# Patient Record
Sex: MALE | Race: WHITE | Employment: FULL TIME | URBAN - METROPOLITAN AREA
[De-identification: names, ages, dates, MRNs, and addresses within clinical notes are randomized per-mention and may not be internally consistent; named-entity substitution may affect disease eponyms.]

---

## 2022-12-14 ENCOUNTER — HOSPITAL ENCOUNTER (OUTPATIENT)
Dept: GENERAL RADIOLOGY | Age: 22
Discharge: HOME | End: 2022-12-16

## 2022-12-14 ENCOUNTER — HOSPITAL ENCOUNTER (OUTPATIENT)
Age: 22
Discharge: HOME | End: 2022-12-16

## 2022-12-14 DIAGNOSIS — S96.911A STRAIN OF RIGHT ANKLE, INITIAL ENCOUNTER: ICD-10-CM

## 2022-12-14 DIAGNOSIS — S90.01XA CONTUSION OF RIGHT ANKLE, INITIAL ENCOUNTER: ICD-10-CM

## 2022-12-14 DIAGNOSIS — S90.31XA CONTUSION OF RIGHT FOOT, INITIAL ENCOUNTER: ICD-10-CM

## 2022-12-14 DIAGNOSIS — S93.601A SPRAIN OF RIGHT FOOT, INITIAL ENCOUNTER: ICD-10-CM

## 2022-12-14 PROCEDURE — 73610 X-RAY EXAM OF ANKLE: CPT

## 2022-12-14 PROCEDURE — 73630 X-RAY EXAM OF FOOT: CPT

## 2022-12-15 ENCOUNTER — OFFICE VISIT (OUTPATIENT)
Dept: PODIATRY | Age: 22
End: 2022-12-15

## 2022-12-15 ENCOUNTER — HOSPITAL ENCOUNTER (OUTPATIENT)
Dept: PREADMISSION TESTING | Age: 22
Setting detail: OUTPATIENT SURGERY
End: 2022-12-15
Payer: COMMERCIAL

## 2022-12-15 ENCOUNTER — ANESTHESIA EVENT (OUTPATIENT)
Dept: OPERATING ROOM | Age: 22
End: 2022-12-15
Payer: COMMERCIAL

## 2022-12-15 VITALS — WEIGHT: 140 LBS | BODY MASS INDEX: 21.97 KG/M2 | HEIGHT: 67 IN

## 2022-12-15 DIAGNOSIS — S92.311A CLOSED DISPLACED FRACTURE OF FIRST METATARSAL BONE OF RIGHT FOOT, INITIAL ENCOUNTER: Primary | ICD-10-CM

## 2022-12-15 DIAGNOSIS — M79.671 PAIN IN RIGHT FOOT: ICD-10-CM

## 2022-12-15 DIAGNOSIS — R60.0 EDEMA, LOWER EXTREMITY: ICD-10-CM

## 2022-12-15 RX ORDER — LISINOPRIL 40 MG/1
TABLET ORAL
COMMUNITY
Start: 2022-10-27

## 2022-12-15 ASSESSMENT — ENCOUNTER SYMPTOMS
BACK PAIN: 0
COLOR CHANGE: 0
SHORTNESS OF BREATH: 0
DIARRHEA: 0
NAUSEA: 0

## 2022-12-15 NOTE — PROGRESS NOTES
Pre-op Instructions For Out-Patient Surgery    Medication Instructions:  Please stop herbs and any supplements now (includes vitamins and minerals). Please contact your surgeon and prescribing physician for pre-op instructions for any blood thinners. If you have inhalers/aerosol treatments at home, please use them the morning of your surgery and bring the inhalers with you to the hospital.    Please take the following medications the morning of your surgery with a sip of water:    lisinopril    Surgery Instructions:  After midnight before surgery:  Do not eat or drink anything, including water, mints, gum, and hard candy. You may brush your teeth without swallowing. No smoking, chewing tobacco, or street drugs. Please shower or bathe before surgery. If you were given Surgical Scrub Chlorhexidine Gluconate Liquid (CHG), please shower the night before and the morning of your surgery following the detailed instructions you received during your pre-admission visit. Please do not wear any cologne, lotion, powder, deodorant, jewelry, piercings, perfume, makeup, nail polish, hair accessories, or hair spray on the day of surgery. Wear loose comfortable clothing. Leave your valuables at home. Bring a storage case for any glasses/contacts. An adult who is responsible for you MUST drive you home and should be with you for the first 24 hours after surgery. If having out-patient knee and foot surgeries, please arrange for planned crutches, walker, or wheelchair before arriving to the hospital.    The Day of Surgery:  Arrive at [address] [place] [address] Surgery Entrance at the time directed by your surgeon and check in at the desk. If you have a living will or healthcare power of , please bring a copy. You will be taken to the pre-op holding area where you will be prepared for surgery.   A physical assessment will be performed by a nurse practitioner or house officer. Your IV will be started and you will meet your anesthesiologist.    When you go to surgery, your family will be directed to the surgical waiting room, where the doctor should speak with them after your surgery. After surgery, you will be taken to the recovery room then when you are awake and stable you will go to the short stay unit for preparation to be discharged. If you use a Bi-PAP or C-PAP machine, please bring it with you and leave it in the car in case it is needed in recovery room.

## 2022-12-15 NOTE — PROGRESS NOTES
Vanna Portillo is a 25 y.o. male who presents to the office today with chief complaint of pain to the right foot. Chief Complaint   Patient presents with    Foot Injury     Patient dropped a post  on right foot yesterday    Symptoms began about 1 day(s) ago. Patient complains of injury to the right foot. Patient states that he dropped a post  on his right foot yesterday. Pain is rated 8 out of 10 at it's worst and is described as intermittent. Treatments prior to today's visit include: Patient went to [facility] for evaluation and xrays were taken. Patient was told that he fractured his foot. Patient was placed in a posterior splint under compression with a fracture shoe and was given crutches with instructions to remain NWB at all times. Patient was referred to my office for care. No Known Allergies    Past Medical History:   Diagnosis Date    High blood pressure        Prior to Admission medications    Medication Sig Start Date End Date Taking? Authorizing Provider   lisinopril (PRINIVIL;ZESTRIL) 40 MG tablet TAKE 1 TABLET BY MOUTH EVERY DAY FOR 90 DAYS 10/27/22   Historical Provider, MD       Past Surgical History:   Procedure Laterality Date    TONSILLECTOMY      WISDOM TOOTH EXTRACTION         No family history on file. Social History     Tobacco Use    Smoking status: Never    Smokeless tobacco: Never   Substance Use Topics    Alcohol use: Yes     Comment: socially       Review of Systems   Constitutional:  Negative for activity change, appetite change, chills, diaphoresis, fatigue and fever. Respiratory:  Negative for shortness of breath. Cardiovascular:  Negative for leg swelling. Gastrointestinal:  Negative for diarrhea and nausea. Endocrine: Negative for cold intolerance, heat intolerance and polyuria. Musculoskeletal:  Positive for gait problem and joint swelling. Negative for arthralgias, back pain and myalgias. Skin:  Negative for color change, pallor, rash and wound. Allergic/Immunologic: Negative for environmental allergies and food allergies. Neurological:  Negative for dizziness, weakness, light-headedness and numbness. Hematological:  Does not bruise/bleed easily. Psychiatric/Behavioral:  Negative for behavioral problems, confusion and self-injury. The patient is not nervous/anxious. Vitals: There were no vitals filed for this visit. General: AAO x 3 in NAD. Integument: There are no rashes, ulcers, or breaks in the skin noted to the bilateral lower extremities. There is no induration, subcutaneous nodules, or tightening of the skin noted to the bilateral.     Toenails 1-5 of the right foot do not present with thickness, elongation, discoloration, brittleness, subungual debris. Toenails 1-5 of the left foot do not present with thickness, elongation, discoloration, brittleness, subungual debris. Interdigital maceration absent to web spaces 1-4, Bilateral.     There are no preulcerative lesions noted to the right foot. There are no preulcerative lesions noted to the left foot. The skin to the bilateral feet is not thin and shiny. The skin to the bilateral feet is  warm, supple, and dry. Vascular: DP pulse of the right foot is  palpable. DP pulse of the left foot is  palpable. PT pulse of the right foot is  palpable. PT pulse of the left foot is  palpable. CFT is less than 3 secs to the digits of the right foot. CFT is less than 3 secs to the digits of the left foot. There is edema noted to the right foot. There is hair growth noted to the digits of the bilateral feet. There are no varicosities noted to the right foot/ankle. There are no varicosities noted to the left foot/ankle. Erythema is absent to the bilateral feet. Neurological: Reflexes are present to the right plantar foot and to the Achilles tendon. Reflexes are present to the left plantar foot and to the Achilles tendon. Epicritic sensation is  intact to the right foot. Epicritic sensation is  intact to the left foot. Musculoskeletal:  Muscle strength is +5/5 to all four muscle groups of the left lower extremity. Muscle strength of the right lower extremity was not evaluated due to the patient's pain and injury. There are no areas of subluxation, dislocation, or laxity noted to either lower extremity. Range of motion to the right ankle is  free of pain or grinding. Range of motion to the left ankle is  free of pain or grinding. Range of motion to the right subtalar joint is  free of pain or grinding. Range of motion to the left subtalar joint is  free of pain or grinding. No abnormalities, asymmetries, or misalignments are seen between the extremities. Weightbearing evaluation was not performed due to the patient's pain and injury. The lesser digits of the right foot are not contracted. The lesser digits of the left foot are not contracted. There is no prominence noted to the first metatarsal head without abduction of the hallux of the right foot. There is no prominence noted to the first metatarsal head without abduction of the hallux of the left foot. There is pain with palpation to the right first metatarsal shaft. Shoe examination was performed. Biomechanical Exam: was not evaluated due to the patient's pain and injury. X-ray's reviewed from the hospital: AP, Lateral, and Medial Oblique of the right foot. These views are NWB. Findings: There is a mildly displaced fracture to the first metatarsal with dorsal displacement. This displacement is most noted on the medial oblique. However, since the lateral view was not done WB, it is difficult to evaluate alignment of the first metatarsal. Therefore, a weightbearing lateral view of the right foot was done here in the office.  There is dorsal displacement noted to the first metatarsal on the weightbearing lateral view.      Asessment: Patient is a 25 y.o. male with:    Diagnosis Orders   1. Closed displaced fracture of first metatarsal bone of right foot, initial encounter  XR FOOT RIGHT (2 VIEWS)    SD PNEUMAT WALKING BOOT PRE CST      2. Edema, lower extremity  XR FOOT RIGHT (2 VIEWS)    SD PNEUMAT WALKING BOOT PRE CST      3. Pain in right foot  XR FOOT RIGHT (2 VIEWS)    SD PNEUMAT WALKING BOOT PRE CST          Plan:  1. Clinical evaluation of the patient. 2. I have dispensed a short pneumatic equalizer walker to the patient's right lower extremity. Due to the patient's diagnosis and related symptoms this is medically necessary for the treatment. The function of this device is to restrict and limit motion and provide stabilization and compression to the affected area. Specific instructions on weight bearing and ROM exercises were discussed and given to the patient. Patient to wear this boot at all times and remain strictly NWB at all times. The goals and function of this device were explained in detail to the patient. Upon dispensing, the device appeared to be fitting well and the patient states that the device is comfortable at this time. The patient was shown how to properly apply, wear, and care for the device. The patient was able to properly apply the device and ambulate with it without distress. At the time that the device was dispensed it was suitable for the patient's condition and was not substandard. No guarantees were given or implied and the precautions of the device were reviewed the patient. Written instructions and warranty information was given along with the list of the twenty-one (21) Durable Medical Equipment Supplier Guidelines. All patient questions were answered satisfactorily. Patient was instructed on proper rest, ice, compression, and elevation of the right lower extremity.  Patient informed that he will require surgery and he is schedule for this tomorrow, 12/16/2022 for ORIF right first metatarsal fracture. 3. Contact office with any questions/problems/concerns. Return if symptoms worsen or fail to improve, for Patient will be scheduled for surgery right foot.    12/15/2022      Charlotte Olvera DPM

## 2022-12-16 ENCOUNTER — ANESTHESIA (OUTPATIENT)
Dept: OPERATING ROOM | Age: 22
End: 2022-12-16
Payer: COMMERCIAL

## 2022-12-16 ENCOUNTER — APPOINTMENT (OUTPATIENT)
Dept: GENERAL RADIOLOGY | Age: 22
End: 2022-12-16
Attending: PODIATRIST
Payer: COMMERCIAL

## 2022-12-16 ENCOUNTER — HOSPITAL ENCOUNTER (OUTPATIENT)
Age: 22
Setting detail: OUTPATIENT SURGERY
Discharge: HOME | End: 2022-12-16
Attending: PODIATRIST | Admitting: PODIATRIST
Payer: COMMERCIAL

## 2022-12-16 VITALS
HEART RATE: 70 BPM | WEIGHT: 140 LBS | BODY MASS INDEX: 21.97 KG/M2 | HEIGHT: 67 IN | DIASTOLIC BLOOD PRESSURE: 57 MMHG | RESPIRATION RATE: 18 BRPM | TEMPERATURE: 98.1 F | SYSTOLIC BLOOD PRESSURE: 105 MMHG | OXYGEN SATURATION: 100 %

## 2022-12-16 DIAGNOSIS — G89.18 POST-OPERATIVE PAIN: Primary | ICD-10-CM

## 2022-12-16 PROCEDURE — 3700000000 HC ANESTHESIA ATTENDED CARE: Performed by: PODIATRIST

## 2022-12-16 PROCEDURE — 2500000003 HC RX 250 WO HCPCS: Performed by: ANESTHESIOLOGY

## 2022-12-16 PROCEDURE — 7100000010 HC PHASE II RECOVERY - FIRST 15 MIN: Performed by: PODIATRIST

## 2022-12-16 PROCEDURE — 7100000000 HC PACU RECOVERY - FIRST 15 MIN: Performed by: PODIATRIST

## 2022-12-16 PROCEDURE — 7100000030 HC ASPR PHASE II RECOVERY - FIRST 15 MIN: Performed by: PODIATRIST

## 2022-12-16 PROCEDURE — 7100000011 HC PHASE II RECOVERY - ADDTL 15 MIN: Performed by: PODIATRIST

## 2022-12-16 PROCEDURE — 3700000001 HC ADD 15 MINUTES (ANESTHESIA): Performed by: PODIATRIST

## 2022-12-16 PROCEDURE — 7100000001 HC PACU RECOVERY - ADDTL 15 MIN: Performed by: PODIATRIST

## 2022-12-16 PROCEDURE — 6370000000 HC RX 637 (ALT 250 FOR IP): Performed by: ANESTHESIOLOGY

## 2022-12-16 PROCEDURE — 6360000002 HC RX W HCPCS

## 2022-12-16 PROCEDURE — 2709999900 HC NON-CHARGEABLE SUPPLY: Performed by: PODIATRIST

## 2022-12-16 PROCEDURE — 3600000013 HC SURGERY LEVEL 3 ADDTL 15MIN: Performed by: PODIATRIST

## 2022-12-16 PROCEDURE — 2500000003 HC RX 250 WO HCPCS: Performed by: PODIATRIST

## 2022-12-16 PROCEDURE — 73630 X-RAY EXAM OF FOOT: CPT

## 2022-12-16 PROCEDURE — 3600000003 HC SURGERY LEVEL 3 BASE: Performed by: PODIATRIST

## 2022-12-16 PROCEDURE — C1713 ANCHOR/SCREW BN/BN,TIS/BN: HCPCS | Performed by: PODIATRIST

## 2022-12-16 PROCEDURE — 2500000003 HC RX 250 WO HCPCS

## 2022-12-16 PROCEDURE — 2580000003 HC RX 258: Performed by: ANESTHESIOLOGY

## 2022-12-16 PROCEDURE — 2720000010 HC SURG SUPPLY STERILE: Performed by: PODIATRIST

## 2022-12-16 PROCEDURE — 7100000031 HC ASPR PHASE II RECOVERY - ADDTL 15 MIN: Performed by: PODIATRIST

## 2022-12-16 DEVICE — LOCKING SCREW, FULLY THREADED,T8
Type: IMPLANTABLE DEVICE | Site: FOOT | Status: FUNCTIONAL
Brand: VARIAX

## 2022-12-16 DEVICE — NARROW LOCKING T-PLATE, 2.4
Type: IMPLANTABLE DEVICE | Site: FOOT | Status: FUNCTIONAL
Brand: VARIAX

## 2022-12-16 DEVICE — BONE SCREW, FULLY THREADED, T8
Type: IMPLANTABLE DEVICE | Site: FOOT | Status: FUNCTIONAL
Brand: VARIAX

## 2022-12-16 DEVICE — CANNULATED SCREW
Type: IMPLANTABLE DEVICE | Site: FOOT | Status: FUNCTIONAL
Brand: ASNIS

## 2022-12-16 DEVICE — LOCKING SCREW
Type: IMPLANTABLE DEVICE | Site: FOOT | Status: FUNCTIONAL
Brand: VARIAX

## 2022-12-16 DEVICE — KIRSCHNER WIRE , L, TIPS TROCAR , SMOOTH: Type: IMPLANTABLE DEVICE | Site: FOOT | Status: FUNCTIONAL

## 2022-12-16 RX ORDER — MEPERIDINE HYDROCHLORIDE 25 MG/ML
12.5 INJECTION INTRAMUSCULAR; INTRAVENOUS; SUBCUTANEOUS EVERY 5 MIN PRN
Status: DISCONTINUED | OUTPATIENT
Start: 2022-12-16 | End: 2022-12-16 | Stop reason: HOSPADM

## 2022-12-16 RX ORDER — LIDOCAINE HYDROCHLORIDE 10 MG/ML
INJECTION, SOLUTION EPIDURAL; INFILTRATION; INTRACAUDAL; PERINEURAL PRN
Status: DISCONTINUED | OUTPATIENT
Start: 2022-12-16 | End: 2022-12-16 | Stop reason: SDUPTHER

## 2022-12-16 RX ORDER — SODIUM CHLORIDE 9 MG/ML
INJECTION, SOLUTION INTRAVENOUS PRN
Status: DISCONTINUED | OUTPATIENT
Start: 2022-12-16 | End: 2022-12-16 | Stop reason: HOSPADM

## 2022-12-16 RX ORDER — OXYCODONE HYDROCHLORIDE AND ACETAMINOPHEN 5; 325 MG/1; MG/1
1 TABLET ORAL EVERY 6 HOURS PRN
Qty: 28 TABLET | Refills: 0 | Status: SHIPPED | OUTPATIENT
Start: 2022-12-16 | End: 2022-12-23

## 2022-12-16 RX ORDER — SODIUM CHLORIDE 0.9 % (FLUSH) 0.9 %
5-40 SYRINGE (ML) INJECTION PRN
Status: DISCONTINUED | OUTPATIENT
Start: 2022-12-16 | End: 2022-12-16 | Stop reason: HOSPADM

## 2022-12-16 RX ORDER — GABAPENTIN 300 MG/1
300 CAPSULE ORAL ONCE
Status: COMPLETED | OUTPATIENT
Start: 2022-12-16 | End: 2022-12-16

## 2022-12-16 RX ORDER — DOXYCYCLINE HYCLATE 100 MG
100 TABLET ORAL DAILY
Qty: 10 TABLET | Refills: 0 | Status: SHIPPED | OUTPATIENT
Start: 2022-12-16 | End: 2022-12-26

## 2022-12-16 RX ORDER — METOCLOPRAMIDE HYDROCHLORIDE 5 MG/ML
10 INJECTION INTRAMUSCULAR; INTRAVENOUS
Status: DISCONTINUED | OUTPATIENT
Start: 2022-12-16 | End: 2022-12-16 | Stop reason: HOSPADM

## 2022-12-16 RX ORDER — ACETAMINOPHEN 325 MG/1
650 TABLET ORAL
Status: DISCONTINUED | OUTPATIENT
Start: 2022-12-16 | End: 2022-12-16 | Stop reason: HOSPADM

## 2022-12-16 RX ORDER — SODIUM CHLORIDE 0.9 % (FLUSH) 0.9 %
5-40 SYRINGE (ML) INJECTION EVERY 12 HOURS SCHEDULED
Status: DISCONTINUED | OUTPATIENT
Start: 2022-12-16 | End: 2022-12-16 | Stop reason: HOSPADM

## 2022-12-16 RX ORDER — DIPHENHYDRAMINE HYDROCHLORIDE 50 MG/ML
12.5 INJECTION INTRAMUSCULAR; INTRAVENOUS
Status: DISCONTINUED | OUTPATIENT
Start: 2022-12-16 | End: 2022-12-16 | Stop reason: HOSPADM

## 2022-12-16 RX ORDER — BUPIVACAINE HYDROCHLORIDE 5 MG/ML
INJECTION, SOLUTION EPIDURAL; INTRACAUDAL PRN
Status: DISCONTINUED | OUTPATIENT
Start: 2022-12-16 | End: 2022-12-16 | Stop reason: ALTCHOICE

## 2022-12-16 RX ORDER — GLYCOPYRROLATE 0.2 MG/ML
INJECTION INTRAMUSCULAR; INTRAVENOUS PRN
Status: DISCONTINUED | OUTPATIENT
Start: 2022-12-16 | End: 2022-12-16 | Stop reason: SDUPTHER

## 2022-12-16 RX ORDER — CEFAZOLIN SODIUM 1 G/3ML
INJECTION, POWDER, FOR SOLUTION INTRAMUSCULAR; INTRAVENOUS PRN
Status: DISCONTINUED | OUTPATIENT
Start: 2022-12-16 | End: 2022-12-16 | Stop reason: SDUPTHER

## 2022-12-16 RX ORDER — ACETAMINOPHEN 500 MG
1000 TABLET ORAL ONCE
Status: COMPLETED | OUTPATIENT
Start: 2022-12-16 | End: 2022-12-16

## 2022-12-16 RX ORDER — MIDAZOLAM HYDROCHLORIDE 1 MG/ML
INJECTION INTRAMUSCULAR; INTRAVENOUS PRN
Status: DISCONTINUED | OUTPATIENT
Start: 2022-12-16 | End: 2022-12-16 | Stop reason: SDUPTHER

## 2022-12-16 RX ORDER — ONDANSETRON 2 MG/ML
4 INJECTION INTRAMUSCULAR; INTRAVENOUS
Status: DISCONTINUED | OUTPATIENT
Start: 2022-12-16 | End: 2022-12-16 | Stop reason: HOSPADM

## 2022-12-16 RX ORDER — BUPIVACAINE HYDROCHLORIDE AND EPINEPHRINE 5; 5 MG/ML; UG/ML
INJECTION, SOLUTION EPIDURAL; INTRACAUDAL; PERINEURAL PRN
Status: DISCONTINUED | OUTPATIENT
Start: 2022-12-16 | End: 2022-12-16 | Stop reason: ALTCHOICE

## 2022-12-16 RX ORDER — KETAMINE HYDROCHLORIDE 10 MG/ML
INJECTION, SOLUTION INTRAMUSCULAR; INTRAVENOUS PRN
Status: DISCONTINUED | OUTPATIENT
Start: 2022-12-16 | End: 2022-12-16 | Stop reason: SDUPTHER

## 2022-12-16 RX ORDER — LIDOCAINE HYDROCHLORIDE 10 MG/ML
1 INJECTION, SOLUTION EPIDURAL; INFILTRATION; INTRACAUDAL; PERINEURAL
Status: COMPLETED | OUTPATIENT
Start: 2022-12-16 | End: 2022-12-16

## 2022-12-16 RX ORDER — EPHEDRINE SULFATE/0.9% NACL/PF 50 MG/5 ML
SYRINGE (ML) INTRAVENOUS PRN
Status: DISCONTINUED | OUTPATIENT
Start: 2022-12-16 | End: 2022-12-16 | Stop reason: SDUPTHER

## 2022-12-16 RX ORDER — FENTANYL CITRATE 0.05 MG/ML
25 INJECTION, SOLUTION INTRAMUSCULAR; INTRAVENOUS EVERY 5 MIN PRN
Status: DISCONTINUED | OUTPATIENT
Start: 2022-12-16 | End: 2022-12-16 | Stop reason: HOSPADM

## 2022-12-16 RX ORDER — HYDRALAZINE HYDROCHLORIDE 20 MG/ML
10 INJECTION INTRAMUSCULAR; INTRAVENOUS
Status: DISCONTINUED | OUTPATIENT
Start: 2022-12-16 | End: 2022-12-16 | Stop reason: HOSPADM

## 2022-12-16 RX ORDER — PROPOFOL 10 MG/ML
INJECTION, EMULSION INTRAVENOUS PRN
Status: DISCONTINUED | OUTPATIENT
Start: 2022-12-16 | End: 2022-12-16 | Stop reason: SDUPTHER

## 2022-12-16 RX ORDER — LIDOCAINE HYDROCHLORIDE 10 MG/ML
INJECTION, SOLUTION INFILTRATION; PERINEURAL PRN
Status: DISCONTINUED | OUTPATIENT
Start: 2022-12-16 | End: 2022-12-16 | Stop reason: ALTCHOICE

## 2022-12-16 RX ORDER — SODIUM CHLORIDE, SODIUM LACTATE, POTASSIUM CHLORIDE, CALCIUM CHLORIDE 600; 310; 30; 20 MG/100ML; MG/100ML; MG/100ML; MG/100ML
INJECTION, SOLUTION INTRAVENOUS CONTINUOUS
Status: DISCONTINUED | OUTPATIENT
Start: 2022-12-16 | End: 2022-12-16 | Stop reason: HOSPADM

## 2022-12-16 RX ORDER — FENTANYL CITRATE 50 UG/ML
INJECTION, SOLUTION INTRAMUSCULAR; INTRAVENOUS PRN
Status: DISCONTINUED | OUTPATIENT
Start: 2022-12-16 | End: 2022-12-16 | Stop reason: SDUPTHER

## 2022-12-16 RX ORDER — LABETALOL HYDROCHLORIDE 5 MG/ML
10 INJECTION, SOLUTION INTRAVENOUS
Status: DISCONTINUED | OUTPATIENT
Start: 2022-12-16 | End: 2022-12-16 | Stop reason: HOSPADM

## 2022-12-16 RX ADMIN — Medication 5 MG: at 13:25

## 2022-12-16 RX ADMIN — KETAMINE HYDROCHLORIDE 30 MG: 10 INJECTION INTRAMUSCULAR; INTRAVENOUS at 12:23

## 2022-12-16 RX ADMIN — Medication 10 MG: at 12:33

## 2022-12-16 RX ADMIN — Medication 10 MG: at 12:14

## 2022-12-16 RX ADMIN — PHENYLEPHRINE HYDROCHLORIDE 50 MCG: 10 INJECTION INTRAVENOUS at 13:09

## 2022-12-16 RX ADMIN — PROPOFOL 75 MG: 10 INJECTION, EMULSION INTRAVENOUS at 13:15

## 2022-12-16 RX ADMIN — LIDOCAINE HYDROCHLORIDE 1 ML: 10 INJECTION, SOLUTION EPIDURAL; INFILTRATION; INTRACAUDAL; PERINEURAL at 10:42

## 2022-12-16 RX ADMIN — FENTANYL CITRATE 50 MCG: 50 INJECTION, SOLUTION INTRAMUSCULAR; INTRAVENOUS at 12:04

## 2022-12-16 RX ADMIN — SODIUM CHLORIDE, POTASSIUM CHLORIDE, SODIUM LACTATE AND CALCIUM CHLORIDE: 600; 310; 30; 20 INJECTION, SOLUTION INTRAVENOUS at 10:46

## 2022-12-16 RX ADMIN — ACETAMINOPHEN 1000 MG: 500 TABLET ORAL at 10:38

## 2022-12-16 RX ADMIN — CEFAZOLIN 2 G: 1 INJECTION, POWDER, FOR SOLUTION INTRAMUSCULAR; INTRAVENOUS at 12:10

## 2022-12-16 RX ADMIN — KETAMINE HYDROCHLORIDE 10 MG: 10 INJECTION INTRAMUSCULAR; INTRAVENOUS at 12:56

## 2022-12-16 RX ADMIN — GLYCOPYRROLATE 0.2 MG: 0.2 INJECTION INTRAMUSCULAR; INTRAVENOUS at 12:23

## 2022-12-16 RX ADMIN — MIDAZOLAM 2 MG: 1 INJECTION INTRAMUSCULAR; INTRAVENOUS at 11:53

## 2022-12-16 RX ADMIN — PROPOFOL 50 MG: 10 INJECTION, EMULSION INTRAVENOUS at 12:04

## 2022-12-16 RX ADMIN — FENTANYL CITRATE 25 MCG: 50 INJECTION, SOLUTION INTRAMUSCULAR; INTRAVENOUS at 13:18

## 2022-12-16 RX ADMIN — Medication 10 MG: at 13:09

## 2022-12-16 RX ADMIN — PROPOFOL 125 MCG/KG/MIN: 10 INJECTION, EMULSION INTRAVENOUS at 12:05

## 2022-12-16 RX ADMIN — GABAPENTIN 300 MG: 300 CAPSULE ORAL at 10:38

## 2022-12-16 RX ADMIN — LIDOCAINE HYDROCHLORIDE 40 MG: 10 INJECTION, SOLUTION EPIDURAL; INFILTRATION; INTRACAUDAL; PERINEURAL at 12:04

## 2022-12-16 RX ADMIN — PHENYLEPHRINE HYDROCHLORIDE 100 MCG: 10 INJECTION INTRAVENOUS at 12:50

## 2022-12-16 RX ADMIN — Medication 5 MG: at 12:50

## 2022-12-16 RX ADMIN — FENTANYL CITRATE 25 MCG: 50 INJECTION, SOLUTION INTRAMUSCULAR; INTRAVENOUS at 12:11

## 2022-12-16 RX ADMIN — Medication 10 MG: at 12:17

## 2022-12-16 ASSESSMENT — ENCOUNTER SYMPTOMS
SHORTNESS OF BREATH: 0
STRIDOR: 0

## 2022-12-16 ASSESSMENT — LIFESTYLE VARIABLES: SMOKING_STATUS: 0

## 2022-12-16 ASSESSMENT — PAIN - FUNCTIONAL ASSESSMENT: PAIN_FUNCTIONAL_ASSESSMENT: 0-10

## 2022-12-16 NOTE — DISCHARGE INSTRUCTIONS
Podiatric Post Operative Instructions: You have had a surgical procedure on your right foot. Fluids and Diet:  Begin with clear liquids, broth, dry toast, and crackers. If not nauseated then resume your regular pre-operative diet when you are ready    Medications: Take your prescriptions as directed  You are receiving new prescriptions for percocet and doxycycline. If your pain is not severe then you may take the non-prescription medication that you normally take for aches and pains  You may resume your regularly scheduled medications (unless otherwise directed)  If any side effects or adverse reactions occur, discontinue the medication and contact your doctor. Review the patient drug information that is provided before you take any medication    Ambulation and Activity:  You are advised to go directly home from the hospital  Use crutches as needed  You may not put weight on the operated foot. You should wear the surgical boot at all times when awake. Avoid stairs if possible. Do not lift or move heavy objects  Do not drive until cleared by your physician    Bandage and Wound Care Instructions:  Keep bandage clean and dry  Do not shower or bathe the operative extremity  Do not remove the bandage (unless otherwise directed)   Do not attempt to put anything between the cast or dressing and your skin, some itching is normal.    Ice and Elevation:  Elevate operative extremity as much as possible to reduce swelling and discomfort. Elevate with 2 pillows at or above the level of the heart for the first 72 hours. Ice:  [facility] dispensed insulated ice bag over the bandage 20 minutes of every hour while awake for the first 72 hours. You may ice behind the knee as well. Special Instructions: Call your doctor immediately if you develop any of the following. Fever over 100.4 degrees Fahrenheit by mouth - take your temperature daily until your first follow up visit.   Pain not relieved by medication ordered  Swelling, increased redness, warmth, or hardness around operative area. Numb, tingling or cold toes. Toe(s) become white or bluish  Bandage becomes wet, soiled, or blood soaked (small amount of bleeding may be normal)  Increased or progressive drainage from surgical area. Follow up instructions: You will need to follow up with Dr. Laci Dee DPM.  Call when you get home to schedule or confirm your appointment. Call your Podiatrist office if you have any questions or concerns.

## 2022-12-16 NOTE — OP NOTE
PODIATRY OP NOTE    PATIENT NAME: Heriberto Sanabria  YOB: 2000  -  25 y.o. male  MRN: [id number]  DATE: 12/16/2022  BILLING #: [id number]    Surgeon(s):  Kym Reis DPM     ASSISTANTS: Topher Weathers DPM PGY-3    PRE-OP DIAGNOSIS:   1st metatarsal fracture, right foot    POST-OP DIAGNOSIS: Same as above. PROCEDURE:   ORIF 1st metatarsal, right foot    ANESTHESIA: General     HEMOSTASIS: Pneumatic ankle tourniquet at 250mmHg    ESTIMATED BLOOD LOSS: Less than 10cc. MATERIALS:   Implant Name Type Inv. Item Serial No.  Lot No. LRB No. Used Action   PLATE NARROW LOCK T 2.4 L41MM 2X5 HOLES - [id number]  PLATE NARROW LOCK T 2.4 L41MM 2X5 HOLES  [facility]  Right 1 Implanted   SCREW BONE T8 FTHRD 2.7X16MM - [id number]  SCREW BONE T8 FTHRD 2.7X16MM  [facility]  Right 1 Implanted   SCREW LK T8 FTHRD 2.7X16MM - [id number]  SCREW LK T8 FTHRD 2.7X16MM  [facility]  Right 1 Implanted   SCREW LK T8 FTHRD 2.7X18MM - [id number]  SCREW LK T8 FTHRD 2.7X18MM  [facility]  Right 3 Implanted   SCREW LK T8 FTHRD 2.7X22MM - [id number]  SCREW LK T8 FTHRD 2.7X22MM  [facility]  Right 2 Implanted   SCREW BNE L30MM DIA3MM THRD L6MM JEANNIE JODY HND FT TI SELF - [id number]  SCREW BNE L30MM DIA3MM THRD L6MM JEANNIE JODY HND FT TI SELF  [employer] [facility]  Right 1 Implanted       INJECTABLES:   Pre-op 10cc 0.5% Marcaine plain. Intra-op 10cc 1% lidocaine plain  Post-op 10cc 0.5% Marcaine with epin    SPECIMEN:   * No specimens in log *    COMPLICATIONS: none    FINDINGS: see op note below. INDICATIONS FOR PROCEDURE: Patient experienced an injury to his right 1st metatarsal after dropping heavy equipment on his foot. Pre-operative imaging     PROCEDURE IN DETAIL: The patient was transported from pre-op to the operating room and placed on the operating table in the supine position with a safety strap across the lap.  A pneumatic tourniquet was applied to the ankle of the operative extremity. After adequate sedation by the Anesthesia, a block of 10cc of  1% marcaine plain was injected. The extremity was then prepped and draped in the usual aseptic fashion. The foot was then exsanguinated with an Esmarch bandage. The pneumatic tourniquet was inflated to 250 mmHg. ORIF of first metatarsal:  Attention was then directed to the dorsomedial aspect of the first metatarsal where a incision measuring approximately 8 cm in length was made using a #15 blade. This incision was deepened using combination of blunt and dull dissection. Care was taken to retract any neurovascular structures out of the field-of-view. Significant hematoma was found at the area of the fracture site, the soft tissue was freed surrounding the first metatarsal until the fracture site was identified. Using a [place] soft tissue was removed around the bone to allow for application of hardware. Once the fracture sites were movable, the fracture was reduced without difficulty; a temporary wire was then placed from distal to proximal.  Radiographs were taken to confirm adequate reduction. The appropriate sized Sharon plate was then selected and placed over the first metatarsal and temporarily held in place with K wires. Locking 2.7 millimeter screws were then inserted into the proximal aspect of the plate, at this time and screws inserted from the distal first metatarsal to proximal first metatarsal perpendicular to the fracture line to provide compression at the fracture site. Next, a combination of locking and nonlocking 2.7 millimeter screws were then inserted at the distal aspect of the first metatarsal.  All screws were placed using appropriate AO technique.     Final radiographs were obtained which showed excellent reduction of the first metatarsal.  The surgical site was then irrigated with copious amounts of sterile saline, skin was then closed in layers using combination of 2-0 Vicryl, 4-0 Vicryl, 4-0 Monocryl and 3-0 nylon suture. Dressings consisting of Xeroform, 4 x 4s, Kerlix and an Ace bandage was applied. The patient tolerated the above procedure and anesthesia well without complications. The patient was transported from the operating room to the PACU with vital signs stable and neurovascularly intact to the right foot.    Postoperative boot was then placed outpatient prior to discharge        Ryne Hwang DPM   Podiatric Medicine & Surgery   12/16/2022 at 1:39 PM

## 2022-12-16 NOTE — ANESTHESIA PRE PROCEDURE
Department of Anesthesiology  Preprocedure Note       Name:  Cherri Najjar   Age:  25 y.o.  :  2000                                          MRN:  [id number]         Date:  2022      Surgeon: Aimee Fair):  Monico Pollard DPM    Procedure: Procedure(s):  OPEN REDUCTION INTERNAL FIXATION 1ST METATARSAL    Medications prior to admission:   Prior to Admission medications    Medication Sig Start Date End Date Taking? Authorizing Provider   lisinopril (PRINIVIL;ZESTRIL) 40 MG tablet TAKE 1 TABLET BY MOUTH EVERY DAY FOR 90 DAYS 10/27/22   Historical Provider, MD       Current medications:    Current Facility-Administered Medications   Medication Dose Route Frequency Provider Last Rate Last Admin    lactated ringers infusion   IntraVENous Continuous Karine Archuleta  mL/hr at 22 1046 New Bag at 22 1046    sodium chloride flush 0.9 % injection 5-40 mL  5-40 mL IntraVENous 2 times per day Karine Archuleta MD        sodium chloride flush 0.9 % injection 5-40 mL  5-40 mL IntraVENous PRN Karine Archuleta MD        0.9 % sodium chloride infusion   IntraVENous PRN Karine Archuleta MD           Allergies:  No Known Allergies    Problem List:  There is no problem list on file for this patient.       Past Medical History:        Diagnosis Date    High blood pressure        Past Surgical History:        Procedure Laterality Date    TONSILLECTOMY      WISDOM TOOTH EXTRACTION         Social History:    Social History     Tobacco Use    Smoking status: Never    Smokeless tobacco: Never   Substance Use Topics    Alcohol use: Yes     Comment: socially                                Counseling given: Not Answered      Vital Signs (Current):   Vitals:    22 1030   BP: 121/63   Pulse: 69   Resp: 16   Temp: 98 °F (36.7 °C)   TempSrc: Infrared   SpO2: 99%   Weight: 140 lb (63.5 kg)   Height: 5' 7\" (1.702 m)                                              BP Readings from Last 3 Encounters:   22 121/63 NPO Status: Time of last liquid consumption: 2300                        Time of last solid consumption: 2300                        Date of last liquid consumption: 12/15/22                        Date of last solid food consumption: 12/15/22    BMI:   Wt Readings from Last 3 Encounters:   12/16/22 140 lb (63.5 kg)   12/15/22 140 lb (63.5 kg)   12/15/22 140 lb (63.5 kg)     Body mass index is 21.93 kg/m². CBC:   Lab Results   Component Value Date/Time    WBC 7.3 07/29/2016 09:10 AM    RBC 5.04 07/29/2016 09:10 AM    HGB 15.6 07/29/2016 09:10 AM    HCT 46.3 07/29/2016 09:10 AM    MCV 91.9 07/29/2016 09:10 AM    RDW 14.1 07/29/2016 09:10 AM     07/29/2016 09:10 AM       CMP:   Lab Results   Component Value Date/Time     07/29/2016 09:10 AM    K 4.8 07/29/2016 09:10 AM     07/29/2016 09:10 AM    CO2 27 07/29/2016 09:10 AM    BUN 12 07/29/2016 09:10 AM    CREATININE 0.79 07/29/2016 09:10 AM    GFRAA NOT REPORTED 07/29/2016 09:10 AM    LABGLOM  07/29/2016 09:10 AM     Pediatric GFR requires additional information. Refer to [facility] website for    GLUCOSE 88 07/29/2016 09:10 AM    PROT 6.5 07/29/2016 09:10 AM    CALCIUM 9.2 07/29/2016 09:10 AM    BILITOT 0.67 07/29/2016 09:10 AM    ALKPHOS 85 07/29/2016 09:10 AM    AST 21 07/29/2016 09:10 AM    ALT 17 07/29/2016 09:10 AM       POC Tests: No results for input(s): POCGLU, POCNA, POCK, POCCL, POCBUN, POCHEMO, POCHCT in the last 72 hours.     Coags: No results found for: PROTIME, INR, APTT    HCG (If Applicable): No results found for: PREGTESTUR, PREGSERUM, HCG, HCGQUANT     ABGs: No results found for: PHART, PO2ART, MGA5CFO, QNI6UPX, BEART, N1FGUGJK     Type & Screen (If Applicable):  No results found for: LABABO, LABRH    Drug/Infectious Status (If Applicable):  No results found for: HIV, HEPCAB    COVID-19 Screening (If Applicable): No results found for: COVID19        Anesthesia Evaluation  Patient summary reviewed and Nursing notes reviewed no history of anesthetic complications:   Airway: Mallampati: I  TM distance: >3 FB   Neck ROM: full  Mouth opening: > = 3 FB   Dental: normal exam         Pulmonary:Negative Pulmonary ROS and normal exam  breath sounds clear to auscultation      (-) pneumonia, COPD, asthma, shortness of breath, recent URI, sleep apnea, rhonchi, wheezes, rales, stridor, not a current smoker and no decreased breath sounds          Patient did not smoke on day of surgery. Cardiovascular:  Exercise tolerance: good (>4 METS),   (+) hypertension: no interval change,     (-) pacemaker, valvular problems/murmurs, past MI, CAD, CABG/stent, dysrhythmias,  angina,  CHF, orthopnea, PND,  HENDRIX, murmur, weak pulses,  friction rub, systolic click, carotid bruit,  JVD, peripheral edema, no pulmonary hypertension and no hyperlipidemia      Rhythm: regular  Rate: normal           Beta Blocker:  Not on Beta Blocker         Neuro/Psych:   Negative Neuro/Psych ROS     (-) seizures, neuromuscular disease, TIA, CVA, headaches, psychiatric history and depression/anxiety            GI/Hepatic/Renal: Neg GI/Hepatic/Renal ROS       (-) hiatal hernia, GERD, PUD, hepatitis, liver disease, no renal disease, bowel prep and no morbid obesity       Endo/Other: Negative Endo/Other ROS   (+) no malignancy/cancer. (-) diabetes mellitus, hypothyroidism, hyperthyroidism, blood dyscrasia, arthritis, no electrolyte abnormalities, no malignancy/cancer               Abdominal:             Vascular: negative vascular ROS. - PVD, DVT and PE. Other Findings:           Anesthesia Plan      general     ASA 2       Induction: intravenous. MIPS: Postoperative opioids intended and Prophylactic antiemetics administered. Anesthetic plan and risks discussed with patient. Plan discussed with CRNA.                     Ronn Mckeon MD   12/16/2022

## 2022-12-16 NOTE — ANESTHESIA POSTPROCEDURE EVALUATION
POST- ANESTHESIA EVALUATION       Pt Name: Cherri Najjar  MRN: [id number]  YOB: 2000  Date of evaluation: 12/16/2022  Time:  1:55 PM      BP (!) 94/38   Pulse 60   Temp 98.5 °F (36.9 °C) (Infrared)   Resp 15   Ht 5' 7\" (1.702 m)   Wt 140 lb (63.5 kg)   SpO2 99%   BMI 21.93 kg/m²      Consciousness Level  Awake  Cardiopulmonary Status  Stable  Pain Adequately Treated YES  Nausea / Vomiting  NO  Adequate Hydration  YES  Anesthesia Related Complications NONE      Electronically signed by Janet Bryant MD on 12/16/2022 at 1:55 PM       Department of Anesthesiology  Postprocedure Note    Patient: Cherri Najjar  MRN: [id number]  YOB: 2000  Date of evaluation: 12/16/2022      Procedure Summary     Date: 12/16/22 Room / Location: [address] [place] [address] Anabel Bryson  / [facility]: [facility]    Anesthesia Start: 1473 Anesthesia Stop: 6789    Procedure: OPEN REDUCTION INTERNAL FIXATION 1ST METATARSAL (Right: Foot) Diagnosis:       Closed displaced fracture of first metatarsal bone of right foot, initial encounter      (CLOSED DISPLACED FRACTURE RIGHT 1ST METATARSAL)    Surgeons: Monico Pollard DPM Responsible Provider: Janet Bryant MD    Anesthesia Type: general ASA Status: 2          Anesthesia Type: No value filed.     Alma Phase I: Alma Score: 8    Alma Phase II:        Anesthesia Post Evaluation

## 2022-12-16 NOTE — H&P
HISTORY and Tre[facility] ALYSSA [address]       NAME:  Semaj Chan  MRN: [id number]   YOB: 2000   Date: 12/16/2022   Age: 25 y.o. Gender: male       COMPLAINT AND PRESENT HISTORY:     Semaj Chan is 25 y.o.,  male, here for CLOSED DISPLACED FRACTURE RIGHT 1ST METATARSAL. He will be having today, OPEN REDUCTION INTERNAL FIXATION 1ST METATARSAL. See office note of Dr. Fausto Roman on 12/15/22  Semaj Chan is a 25 y.o. male who presents to the office today with chief complaint of pain to the right foot. Chief Complaint  Patient presents with   Foot Injury  Patient dropped a post  on right foot yesterday   Symptoms began about 1 day(s) ago. Patient complains of injury to the right foot. Patient states that he dropped a post  on his right foot yesterday. Pain is rated 8 out of 10 at it's worst and is described as intermittent. Treatments prior to today's visit include: Patient went to [facility] for evaluation and xrays were taken. Patient was told that he fractured his foot. Patient was placed in a posterior splint under compression with a fracture shoe and was given crutches with instructions to remain NWB at all times. Patient was referred to my office for care    Above reviewed with patient and he concurs  and is unchanged for today. Pt sttates that he was prescribed Norco and took 3 yesterday. Patient comes in with surgical shoe and crutches he states that pain is still at 8/10. Pt denies any stomach pain, nausea or vomiting. Denies any fever or chills. Patient has been NPO since midnight. No blood thinners in the past 5-7 days. Pt took Lisinopril this am.     Significant medical History:  HTN, Vapes  Patient is Lisinopril    Patient denies any personal history of blood clots. Activity level:  Functional Capacity per patient:  limitations with injury              1. Patient is  able to walk 2 city blocks on level ground without SOB.               2. Patient is able to climb 2 flights of stairs without SOB. Patient denies any personal or family problems with anesthesia. IMAGING    X-ray's reviewed from the hospital: AP, Lateral, and Medial Oblique of the right foot. These views are NWB. Findings: There is a mildly displaced fracture to the first metatarsal with dorsal displacement. This displacement is most noted on the medial oblique. However, since the lateral view was not done WB, it is difficult to evaluate alignment of the first metatarsal. Therefore, a weightbearing lateral view of the right foot was done here in the office. There is dorsal displacement noted to the first metatarsal on the weightbearing lateral view    PAST MEDICAL HISTORY     Past Medical History:   Diagnosis Date    High blood pressure        SURGICAL HISTORY       Past Surgical History:   Procedure Laterality Date    TONSILLECTOMY      WISDOM TOOTH EXTRACTION         FAMILY HISTORY     No family history on file. SOCIAL HISTORY       Social History     Socioeconomic History    Marital status: Single   Tobacco Use    Smoking status: Never    Smokeless tobacco: Never   Vaping Use    Vaping Use: Every day    Substances: Nicotine   Substance and Sexual Activity    Alcohol use: Yes     Comment: socially    Drug use: Not Currently           REVIEW OF SYSTEMS      No Known Allergies    No current facility-administered medications on file prior to encounter. Current Outpatient Medications on File Prior to Encounter   Medication Sig Dispense Refill    lisinopril (PRINIVIL;ZESTRIL) 40 MG tablet TAKE 1 TABLET BY MOUTH EVERY DAY FOR 90 DAYS         Negative except for what is mentioned in the HPI. GENERAL PHYSICAL EXAM     Vitals :   See vital signs in RN flow sheet. GENERAL APPEARANCE:   Teodora Brush is 25 y.o., male, not obese, nourished, conscious, alert. Does not appear to be distress or pain at this time. SKIN:  Warm, dry, no cyanosis or jaundice. HEAD:  Normocephalic, atraumatic, no swelling or tenderness. EYES:  Pupils equal, reactive to light. EARS:  No discharge, no marked hearing loss. NOSE:  No rhinorrhea, epistaxis or septal deformity. THROAT:  Not congested. No ulceration bleeding or discharge. NECK:  No stiffness, trachea central.  No palpable masses or L.N.                 CHEST:  Symmetrical and equal on expansion. HEART:  RRR . No audible murmurs or gallops. LUNGS:  Equal on expansion, normal breath sounds. No adventitious sounds. ABDOMEN:  Obese. Soft on palpation. No dysphagia, No localized tenderness. No guarding or rigidity. LYMPHATICS:  No palpable cervical lymphadenopathy. LOCOMOTOR, BACK AND SPINE:  No tenderness or deformities. EXTREMITIES:  Symmetrical, no pretibial edema. No discoloration or ulcerations. Posterior splint to right foot. Comes in using crutches. Pt able to wiggle toes to rt foot. NEUROLOGIC:  The patient is conscious, alert, oriented,Cranial nerve II-XII intact, taste and smell were not examined. No apparent focal sensory or motor deficits. PROVISIONAL DIAGNOSES / SURGERY:        OPEN REDUCTION INTERNAL FIXATION 1ST METATARSAL    CLOSED DISPLACED FRACTURE RIGHT 1ST METATARSAL      There are no problems to display for this patient.           MICKY REBOLLAR, APRN - CNP on 12/16/2022 at 9:24 AM

## 2022-12-19 PROBLEM — R60.0 EDEMA OF RIGHT FOOT: Status: ACTIVE | Noted: 2022-12-19

## 2022-12-19 PROBLEM — M79.671 RIGHT FOOT PAIN: Status: ACTIVE | Noted: 2022-12-19

## 2022-12-19 PROBLEM — S92.311D: Status: ACTIVE | Noted: 2022-12-19

## 2022-12-20 ENCOUNTER — OFFICE VISIT (OUTPATIENT)
Dept: PODIATRY | Age: 22
End: 2022-12-20

## 2022-12-20 VITALS — BODY MASS INDEX: 21.97 KG/M2 | WEIGHT: 140 LBS | HEIGHT: 67 IN

## 2022-12-20 DIAGNOSIS — R60.0 EDEMA, LOWER EXTREMITY: ICD-10-CM

## 2022-12-20 DIAGNOSIS — Z98.890 POST-OPERATIVE STATE: ICD-10-CM

## 2022-12-20 DIAGNOSIS — M79.671 PAIN IN RIGHT FOOT: ICD-10-CM

## 2022-12-20 DIAGNOSIS — S92.311D CLOSED DISPLACED FRACTURE OF FIRST METATARSAL BONE OF RIGHT FOOT WITH ROUTINE HEALING, SUBSEQUENT ENCOUNTER: Primary | ICD-10-CM

## 2022-12-20 PROCEDURE — 99024 POSTOP FOLLOW-UP VISIT: CPT | Performed by: PODIATRIST

## 2022-12-20 ASSESSMENT — ENCOUNTER SYMPTOMS
BACK PAIN: 0
DIARRHEA: 0
SHORTNESS OF BREATH: 0
COLOR CHANGE: 0
NAUSEA: 0

## 2022-12-20 NOTE — PROGRESS NOTES
Subjective: Patient presents to the office today status-post ORIF right first metatarsal. Patient states they have kept the dressing to the right lower extremity clean, dry, and intact since the surgery. Patient states that he has remained NWB to the RLE with CAM walker and crutch assist since his surgery. Patient states that their pain has been well-controlled with the prescribed pain medication. Patient states that they have been taking their oral antibiotics as prescribed. Patient denies any fever, chills, nausea, vomiting, or night sweats. Review of Systems   Constitutional:  Negative for activity change, appetite change, chills, diaphoresis, fatigue and fever. Respiratory:  Negative for shortness of breath. Cardiovascular:  Negative for leg swelling. Gastrointestinal:  Negative for diarrhea and nausea. Endocrine: Negative for cold intolerance, heat intolerance and polyuria. Musculoskeletal:  Positive for gait problem and joint swelling. Negative for arthralgias, back pain and myalgias. Skin:  Positive for wound. Negative for color change, pallor and rash. Allergic/Immunologic: Negative for environmental allergies and food allergies. Neurological:  Negative for dizziness, weakness, light-headedness and numbness. Hematological:  Does not bruise/bleed easily. Psychiatric/Behavioral:  Negative for behavioral problems, confusion and self-injury. The patient is not nervous/anxious. Objective: Clinical evaluation of the patient reveals dressing to the right lower extremity to be clean, dry, and intact. Removal of the dressing reveals incision to be well-coapted and the sutures intact. There is erythema surrounding the incision site, but it does not extend beyond this area. There is no calor, drainage, or malodor noted to the surgical site. There is moderate non-pitting edema present to the right lower extremity. Clinically there is adequate alignment noted to the first ray of the right foot. X-ray's taken: AP, Lateral, and Medial Oblique of the right foot. Findings: There is adequate reduction with intact internal fixation to the first metatarsal.   Assessment:    Diagnosis Orders   1. Closed displaced fracture of first metatarsal bone of right foot with routine healing, subsequent encounter  XR FOOT RIGHT (MIN 3 VIEWS)      2. Edema, lower extremity  XR FOOT RIGHT (MIN 3 VIEWS)      3. Pain in right foot  XR FOOT RIGHT (MIN 3 VIEWS)      4. Post-operative state  XR FOOT RIGHT (MIN 3 VIEWS)        Plan: Antibiotic ointment was placed along the incision and a dry sterile dressing was applied. The patient was instructed to keep the dressing clean, dry, and intact until the next visit. Patient to remain NWB at all times with CAM walker and crutch assist to the RLE. The patient is to follow up in the office as previously scheduled for likely suture removal. Return in about 2 weeks (around 1/3/2023) for Second postoperative evaluation.    12/20/2022      Ольга Sanchez DPM

## 2022-12-20 NOTE — LETTER
[address] [address]  Phone: [phone]  Fax: [phone]    Junior Hinojosa        December 20, 2022     Patient: Cherri Najjar   YOB: 2000   Date of Visit: 12/20/2022       To Whom It May Concern: It is my medical opinion that Faraz Pyle may return to work on 12/28/2022 with the following restrictions: sitting only until 1/9/2023. If you have any questions or concerns, please don't hesitate to call.     Sincerely,        Monico Pollard DPM

## 2023-01-03 ENCOUNTER — OFFICE VISIT (OUTPATIENT)
Dept: PODIATRY | Age: 23
End: 2023-01-03

## 2023-01-03 VITALS — HEIGHT: 67 IN | BODY MASS INDEX: 21.97 KG/M2 | WEIGHT: 140 LBS

## 2023-01-03 DIAGNOSIS — R60.0 EDEMA, LOWER EXTREMITY: ICD-10-CM

## 2023-01-03 DIAGNOSIS — M79.671 PAIN IN RIGHT FOOT: ICD-10-CM

## 2023-01-03 DIAGNOSIS — Z98.890 POST-OPERATIVE STATE: ICD-10-CM

## 2023-01-03 DIAGNOSIS — S92.311D CLOSED DISPLACED FRACTURE OF FIRST METATARSAL BONE OF RIGHT FOOT WITH ROUTINE HEALING, SUBSEQUENT ENCOUNTER: Primary | ICD-10-CM

## 2023-01-03 PROCEDURE — 99024 POSTOP FOLLOW-UP VISIT: CPT | Performed by: PODIATRIST

## 2023-01-03 ASSESSMENT — ENCOUNTER SYMPTOMS
NAUSEA: 0
DIARRHEA: 0
BACK PAIN: 0
COLOR CHANGE: 0
SHORTNESS OF BREATH: 0

## 2023-01-03 NOTE — PROGRESS NOTES
Subjective: Patient presents to the office today for their second post-operative evaluation of ORIF right first metatarsal. Patient states that they have kept the dressing to the right lower extremity clean, dry, and intact since last visit. Patient states that he has remained NWB with crutch assist since last visit. Patient states that their pain continues to be well controlled with the prescribed pain medication. Review of Systems   Constitutional:  Negative for activity change, appetite change, chills, diaphoresis, fatigue and fever. Respiratory:  Negative for shortness of breath. Cardiovascular:  Negative for leg swelling. Gastrointestinal:  Negative for diarrhea and nausea. Endocrine: Negative for cold intolerance, heat intolerance and polyuria. Musculoskeletal:  Positive for gait problem and joint swelling. Negative for arthralgias, back pain and myalgias. Skin:  Positive for wound. Negative for color change, pallor and rash. Allergic/Immunologic: Negative for environmental allergies and food allergies. Neurological:  Negative for dizziness, weakness, light-headedness and numbness. Hematological:  Does not bruise/bleed easily. Psychiatric/Behavioral:  Negative for behavioral problems, confusion and self-injury. The patient is not nervous/anxious. Objective: Clinical evaluation of the patient reveals dressing to the right lower extremity to be clean, dry, and intact. Removal of the dressing reveals incision to be well co-apted and the sutures intact. Tension placed along the incision line does not produce any gapping. There is mild erythema remaining around the incision site, but it does not extend beyond this area. There is no calor, drainage, or malodor noted to the surgical site. There remains mild nonpitting edema to the right lower extremity. Clinically, there is adequate alignment noted to the right first ray.  The sutures were removed and tension was once again placed along the incision line. There was some gapping noted to the central portion of the incision, but this does not penetrate through the dermis. Assessment:    Diagnosis Orders   1. Closed displaced fracture of first metatarsal bone of right foot with routine healing, subsequent encounter        2. Edema, lower extremity        3. Pain in right foot        4. Post-operative state          Plan: The wound to the right foot was dressed with antibiotic ointment and a band aid. Patient to change this dressing daily. Patient to remain NWB with crutch assist at all times. Return in about 1 week (around 1/10/2023) for Wound Care.      Velasquez Alfonso DPM

## 2023-01-10 ENCOUNTER — OFFICE VISIT (OUTPATIENT)
Dept: PODIATRY | Age: 23
End: 2023-01-10

## 2023-01-10 VITALS — HEIGHT: 67 IN | WEIGHT: 140 LBS | BODY MASS INDEX: 21.97 KG/M2

## 2023-01-10 DIAGNOSIS — R60.0 EDEMA, LOWER EXTREMITY: ICD-10-CM

## 2023-01-10 DIAGNOSIS — M79.671 PAIN IN RIGHT FOOT: ICD-10-CM

## 2023-01-10 DIAGNOSIS — T81.31XD POSTOPERATIVE WOUND DEHISCENCE, SUBSEQUENT ENCOUNTER: ICD-10-CM

## 2023-01-10 DIAGNOSIS — Z98.890 POST-OPERATIVE STATE: ICD-10-CM

## 2023-01-10 DIAGNOSIS — S92.311D CLOSED DISPLACED FRACTURE OF FIRST METATARSAL BONE OF RIGHT FOOT WITH ROUTINE HEALING, SUBSEQUENT ENCOUNTER: Primary | ICD-10-CM

## 2023-01-10 ASSESSMENT — ENCOUNTER SYMPTOMS
DIARRHEA: 0
SHORTNESS OF BREATH: 0
BACK PAIN: 0
NAUSEA: 0
COLOR CHANGE: 0

## 2023-01-10 NOTE — PROGRESS NOTES
[address] [place] [address] Podiatry  Return Patient Progress Note    Subjective: Dimitri Rob 25 y.o. male that presents for follow up evaluation of post op wound dehiscence to the right foot. Chief Complaint   Patient presents with    Post-Op Check     Right foot    Patient's treatment thus far has included daily dressing changes with antibiotic ointment and a band aid. Pain is rated 4 out of 10 and is described as intermittent. Patient has been following my prescribed course of therapy as instructed. Patient states that he has remained NWB to the RLE as instructed since last visit. Review of Systems   Constitutional:  Negative for activity change, appetite change, chills, diaphoresis, fatigue and fever. Respiratory:  Negative for shortness of breath. Cardiovascular:  Negative for leg swelling. Gastrointestinal:  Negative for diarrhea and nausea. Endocrine: Negative for cold intolerance, heat intolerance and polyuria. Musculoskeletal:  Positive for gait problem and joint swelling. Negative for arthralgias, back pain and myalgias. Skin:  Positive for wound. Negative for color change, pallor and rash. Allergic/Immunologic: Negative for environmental allergies and food allergies. Neurological:  Negative for dizziness, weakness, light-headedness and numbness. Hematological:  Does not bruise/bleed easily. Psychiatric/Behavioral:  Negative for behavioral problems, confusion and self-injury. The patient is not nervous/anxious. Objective: Clinical evaluation of the patient reveals only mild gapping to the surgical incision of the right foot. This gapping is superficial and does not penetrate through the dermis. There is no surrounding erythema or calor noted to the right foot. The skin to the surgical site of the right foot is macerated. There is no drainage or malodor noted to the wound. There is mild pain with palpation to the right foot. Assessment:    Diagnosis Orders   1.  Closed displaced fracture of first metatarsal bone of right foot with routine healing, subsequent encounter        2. Postoperative wound dehiscence, subsequent encounter        3. Edema, lower extremity        4. Pain in right foot        5. Post-operative state              Plan: 1. Clinical evaluation of the patient. 2. The wound and skin of the right foot were treated with betadine and a band aid was applied. Patient to change this dressing daily. Patient to continue with NWB to the RLE at all times. 3. Return in about 3 weeks (around 1/31/2023) for post op evaluation with xrays.    1/10/2023      Leopoldo Corral, DPM

## 2023-01-31 ENCOUNTER — OFFICE VISIT (OUTPATIENT)
Dept: PODIATRY | Age: 23
End: 2023-01-31

## 2023-01-31 VITALS — WEIGHT: 140 LBS | HEIGHT: 67 IN | BODY MASS INDEX: 21.97 KG/M2

## 2023-01-31 DIAGNOSIS — S92.311D CLOSED DISPLACED FRACTURE OF FIRST METATARSAL BONE OF RIGHT FOOT WITH ROUTINE HEALING, SUBSEQUENT ENCOUNTER: Primary | ICD-10-CM

## 2023-01-31 DIAGNOSIS — M79.671 PAIN IN RIGHT FOOT: ICD-10-CM

## 2023-01-31 DIAGNOSIS — Z98.890 POST-OPERATIVE STATE: ICD-10-CM

## 2023-01-31 DIAGNOSIS — R60.0 EDEMA, LOWER EXTREMITY: ICD-10-CM

## 2023-01-31 PROCEDURE — 99024 POSTOP FOLLOW-UP VISIT: CPT | Performed by: PODIATRIST

## 2023-01-31 ASSESSMENT — ENCOUNTER SYMPTOMS
BACK PAIN: 0
DIARRHEA: 0
NAUSEA: 0
COLOR CHANGE: 0
SHORTNESS OF BREATH: 0

## 2023-01-31 NOTE — PROGRESS NOTES
[facility] Podiatry  Return Patient Progress Note    Subjective: Julian Mena 25 y.o. male that presents for follow up evaluation of ORIF right first metatarsal fracture. Chief Complaint   Patient presents with    Post-Op Check     Right foot    Patient's treatment thus far has included NWB with CAM walker immobilization. Pain is rated 0 out of 10 and is described as none. Patient has been following my prescribed course of therapy as instructed. Patient states that the wound to the surgical area has healed. Review of Systems   Constitutional:  Negative for activity change, appetite change, chills, diaphoresis, fatigue and fever. Respiratory:  Negative for shortness of breath. Cardiovascular:  Negative for leg swelling. Gastrointestinal:  Negative for diarrhea and nausea. Endocrine: Negative for cold intolerance, heat intolerance and polyuria. Musculoskeletal:  Positive for gait problem and joint swelling. Negative for arthralgias, back pain and myalgias. Skin:  Negative for color change, pallor, rash and wound. Allergic/Immunologic: Negative for environmental allergies and food allergies. Neurological:  Negative for dizziness, weakness, light-headedness and numbness. Hematological:  Does not bruise/bleed easily. Psychiatric/Behavioral:  Negative for behavioral problems, confusion and self-injury. The patient is not nervous/anxious. Objective: Clinical evaluation of the patient reveals adequate alignment to the right first ray. Surgical incision to the right foot is adequately healed at this time. There is edema noted to the right foot, but there is no erythema or calor noted. There is only mild pain with manipulation of the right first metatarsal. There is only mild pain with range of motion to the right first metatarsal cuneiform joint. Range of motion to the right first MTPJ is severely limited and there is pain with this, especially with dorsiflexion.  X-ray's taken: AP, Lateral, and Medial Oblique of the right foot. Findings: There remains adequate reduction with intact internal fixation to the first metatarsal. There is nearly complete osseous union of the first metatarsal, with only a small area remaining open that is visible on the medial oblique view. Assessment:    Diagnosis Orders   1. Closed displaced fracture of first metatarsal bone of right foot with routine healing, subsequent encounter  XR FOOT RIGHT (MIN 3 VIEWS)      2. Edema, lower extremity  XR FOOT RIGHT (MIN 3 VIEWS)      3. Pain in right foot  XR FOOT RIGHT (MIN 3 VIEWS)      4. Post-operative state  XR FOOT RIGHT (MIN 3 VIEWS)            Plan: 1. Clinical evaluation of the patient. 2. Patient informed that he is healing well and may begin full weightbearing on the right foot as long as he wears his CAM walker. Patient is to use his crutches for balance until he regains enough strength to go without them. 3. Return in about 2 weeks (around 2/14/2023) for post op right foot with xrays.    1/31/2023      Payam Abarca DPM

## 2023-02-14 ENCOUNTER — OFFICE VISIT (OUTPATIENT)
Dept: PODIATRY | Age: 23
End: 2023-02-14
Payer: COMMERCIAL

## 2023-02-14 VITALS — HEIGHT: 67 IN | BODY MASS INDEX: 21.97 KG/M2 | WEIGHT: 140 LBS

## 2023-02-14 DIAGNOSIS — Z98.890 POST-OPERATIVE STATE: ICD-10-CM

## 2023-02-14 DIAGNOSIS — M79.671 PAIN IN RIGHT FOOT: ICD-10-CM

## 2023-02-14 DIAGNOSIS — R60.0 EDEMA, LOWER EXTREMITY: ICD-10-CM

## 2023-02-14 DIAGNOSIS — S92.311D CLOSED DISPLACED FRACTURE OF FIRST METATARSAL BONE OF RIGHT FOOT WITH ROUTINE HEALING, SUBSEQUENT ENCOUNTER: Primary | ICD-10-CM

## 2023-02-14 PROCEDURE — 99024 POSTOP FOLLOW-UP VISIT: CPT | Performed by: PODIATRIST

## 2023-02-14 NOTE — LETTER
[address] [address]  Phone: [phone]  Fax: [phone]    Albert Daniele        February 14, 2023     Patient: Vivek Jorge   YOB: 2000   Date of Visit: 2/14/2023       To Whom It May Concern: It is my medical opinion that Chantelle Menezes may return to full duty immediately with no restrictions. If you have any questions or concerns, please don't hesitate to call.     Sincerely,        Navid Fernandes DPM

## 2023-02-14 NOTE — PROGRESS NOTES
[address] [place] [address] Podiatry  Return Patient Progress Note    Subjective: Alexandria Nelson 21 y.o. male that presents for follow up evaluation of ORIF right first metatarsal fracture. Chief Complaint   Patient presents with    Post-Op Check     Post op right,doing good     Patient's treatment thus far has included FWB in CAM walker. Pain is rated 0 out of 10 and is described as none. Patient has been following my prescribed course of therapy as instructed. Review of Systems   Constitutional:  Negative for activity change, appetite change, chills, diaphoresis, fatigue and fever. Respiratory:  Negative for shortness of breath. Cardiovascular:  Negative for leg swelling. Gastrointestinal:  Negative for diarrhea and nausea. Endocrine: Negative for cold intolerance, heat intolerance and polyuria. Musculoskeletal:  Positive for gait problem and joint swelling. Negative for arthralgias, back pain and myalgias. Skin:  Negative for color change, pallor, rash and wound. Allergic/Immunologic: Negative for environmental allergies and food allergies. Neurological:  Negative for dizziness, weakness, light-headedness and numbness. Hematological:  Does not bruise/bleed easily. Psychiatric/Behavioral:  Negative for behavioral problems, confusion and self-injury. The patient is not nervous/anxious. Objective: Clinical evaluation of the patient reveals adequate alignment to the right first ray. Surgical incision to the right foot remains adequately. There is only minimal edema noted to the right foot, but there is no erythema or calor noted. There is only mild pain with manipulation of the right first metatarsal. There is only mild pain with range of motion to the right first metatarsal cuneiform joint. Range of motion to the right first MTPJ remains limited, but this has improved greatly since last visit. There is mild pain with this range of motion, especially with dorsiflexion.  X-ray's taken: AP, Lateral, and Medial Oblique of the right foot. Findings: There remains adequate reduction with intact internal fixation to the first metatarsal. There is adequate osseous union of the first metatarsal at this time. Assessment:    Diagnosis Orders   1. Closed displaced fracture of first metatarsal bone of right foot with routine healing, subsequent encounter  XR FOOT RIGHT (MIN 3 VIEWS)      2. Edema, lower extremity  XR FOOT RIGHT (MIN 3 VIEWS)      3. Pain in right foot  XR FOOT RIGHT (MIN 3 VIEWS)      4. Post-operative state  XR FOOT RIGHT (MIN 3 VIEWS)            Plan: 1. Clinical evaluation of the patient. 2. Patient informed that he has adequately healed and he may resume regular shoe wear and activity at this time. Patient is to continue with range of motion exercises to the right first MTPJ. 3. Return if symptoms worsen or fail to improve.    2/14/2023      Jerry Cochran DPM

## 2023-02-16 ASSESSMENT — ENCOUNTER SYMPTOMS
NAUSEA: 0
BACK PAIN: 0
SHORTNESS OF BREATH: 0
COLOR CHANGE: 0
DIARRHEA: 0

## (undated) DEVICE — COUNTERSINK SURG DIA3MM CANN ADD ON CPL DISP FOR ASNS MIC

## (undated) DEVICE — ZIMMER® STERILE DISPOSABLE TOURNIQUET CUFF WITH PLC, DUAL PORT, SINGLE BLADDER, 18 IN. (46 CM)

## (undated) DEVICE — SUTURE ETHLN SZ 3-0 L18IN NONABSORBABLE BLK FS-1 L24MM 3/8 663H

## (undated) DEVICE — SINGLE PORT MANIFOLD: Brand: NEPTUNE 2

## (undated) DEVICE — CUSTOM PACK: Brand: UNBRANDED

## (undated) DEVICE — BIT DRL DIA2.1MM AO CPL CANN DISP FOR 3MM SCR ASNS MIC SYS

## (undated) DEVICE — GLOVE SURG SZ 85 L12IN FNGR ORTHO 126MIL CRM LTX FREE

## (undated) DEVICE — Device

## (undated) DEVICE — K-WIRE: Brand: ASNIS

## (undated) DEVICE — SUTURE VCRL + SZ 4-0 L27IN ABSRB WHT FS-2 3/8 CIR REV CUT VCP422H

## (undated) DEVICE — SOLUTION IRRIG 1000ML 0.9% SOD CHL USP POUR PLAS BTL

## (undated) DEVICE — MERCY HEALTH ST CHARLES: Brand: MEDLINE INDUSTRIES, INC.

## (undated) DEVICE — DRESSING,GAUZE,XEROFORM,CURAD,1"X8",ST: Brand: CURAD

## (undated) DEVICE — DRILL, AO, SCALED: Brand: VARIAX

## (undated) DEVICE — SUTURE VCRL + SZ 2-0 L27IN ABSRB UD CT-2 L26MM 1/2 CIR TAPR VCP269H

## (undated) DEVICE — CANNULATED COUNTERSINK: Brand: ASNIS

## (undated) DEVICE — BANDAGE,ELASTIC,ESMARK,STERILE,4"X9',LF: Brand: MEDLINE

## (undated) DEVICE — BLANKET WRM W29.9XL79.1IN UP BODY FORC AIR MISTRAL-AIR

## (undated) DEVICE — COVER,TABLE,60X90,STERILE: Brand: MEDLINE

## (undated) DEVICE — CANNULATED DRILL: Brand: ASNIS

## (undated) DEVICE — UNTHREADED GUIDE WIRE: Brand: FIXOS